# Patient Record
Sex: FEMALE | Race: WHITE
[De-identification: names, ages, dates, MRNs, and addresses within clinical notes are randomized per-mention and may not be internally consistent; named-entity substitution may affect disease eponyms.]

---

## 2017-02-18 ENCOUNTER — HOSPITAL ENCOUNTER (EMERGENCY)
Dept: HOSPITAL 10 - FTE | Age: 27
LOS: 1 days | Discharge: HOME | End: 2017-02-19
Payer: COMMERCIAL

## 2017-02-18 VITALS
WEIGHT: 155.43 LBS | BODY MASS INDEX: 25.9 KG/M2 | HEIGHT: 65 IN | HEIGHT: 65 IN | BODY MASS INDEX: 25.9 KG/M2 | WEIGHT: 155.43 LBS

## 2017-02-18 DIAGNOSIS — K64.9: ICD-10-CM

## 2017-02-18 DIAGNOSIS — R10.13: Primary | ICD-10-CM

## 2017-02-18 PROCEDURE — 99283 EMERGENCY DEPT VISIT LOW MDM: CPT

## 2017-02-18 PROCEDURE — 81003 URINALYSIS AUTO W/O SCOPE: CPT

## 2017-02-19 NOTE — ERD
ER Documentation


Chief Complaint


Date/Time


DATE: 2/19/17 


TIME: 00:03


Chief Complaint


AP x1 wk. feels bloated





HPI


Patient is a 27-year-old female with a past medical history of hemorrhoids, 

gastritis who presents the emergency department with epigastric pain 1 week.  

Patient states that she is having "heartburn" and increased burping over the 

last week.  Patient states that she does eat spicy and fried foods.  She states 

that she is trying to cut back.  Patient denies any fever, nausea, vomiting.  

Patient reports intermittent chills.  Patient also reports increased bloating.  

Patient states that today she had one episode of bright red blood on her toilet 

paper when wiping.  Patient states that she does strain.  Patient reports daily 

bowel movements but states that they are often hard.  She denies any chest pain

, shortness of breath, arm pain, diaphoresis.  Patient does have some burning 

pain with urination.  Patient denies any hematuria.  Patient states her last 

menstrual period was on February 4, 2017





ROS


All systems reviewed and are negative except as per history of present illness.





Medications


Home Meds


Active Scripts


Hydrocortisone Acetate* (Anusol-HC*) 30 Gm Cream.gm., 1 APPLIC DE BID, #1 TUB


   Prov:WILLOW MCCAULEY PA-C         2/19/17


Famotidine* (Pepcid*) 20 Mg Tablet, 20 MG PO BID for 30 Days, #60 TAB


   Prov:WILLOW MCCAULEY PA-C         2/18/17


Ibuprofen* (Motrin*) 600 Mg Tab, 600 MG PO Q6, #20 TAB


   Prov:NICK GAMEZ NP         5/9/16


Diclofenac Sodium* (Diclofenac Sodium*) 50 Mg Tablet.dr, 50 MG PO BID, #14 TAB


   Prov:NICK GAMEZ NP         5/9/16


Acetaminophen* (Tylophen*) 500 Mg Capsule, 1 CAP PO Q6H Y for PAIN AND OR 

ELEVATED TEMP, #20 CAP


   Prov:EULA LIMA MD         3/31/16


Famotidine* (Famotidine*) 40 Mg Tablet, 40 MG PO HS, #30 TAB


   Prov:EULA LIMA MD         3/31/16


Ibuprofen* (Motrin*) 600 Mg Tab, 600 MG PO Q6, #30 TAB


   Prov:TAYO HOPE         1/15/16


Pramoxine Hcl* (Anusol*) 28.3 Gm Oint...g., 1 APPLIC DE BID for 7 Days, TUB


   Prov:TAYO HOPE         1/15/16





Allergies


Allergies:  


Coded Allergies:  


     No Known Allergy (Unverified , 1/15/16)





PMhx/Soc


Medical and Surgical Hx:  pt denies Medical Hx, pt denies Surgical Hx


History of Surgery:  No


Anesthesia Reaction:  No


Hx Neurological Disorder:  No


Hx Respiratory Disorders:  No


Hx Cardiac Disorders:  No


Hx Psychiatric Problems:  No


Hx Miscellaneous Medical Probl:  No


Hx Alcohol Use:  Yes (SOCIALLY)


Hx Substance Use:  No


Hx Tobacco Use:  No


Smoking Status:  Never smoker





FmHx


Family History:  No diabetes





Physical Exam


Vitals





Vital Signs








  Date Time  Temp Pulse Resp B/P Pulse Ox O2 Delivery O2 Flow Rate FiO2


 


2/18/17 23:03 98.0 96 18 130/57 100   








Physical Exam


GENERAL: Well-developed, well-nourished female. Appears in no acute distress. 


HEAD: Normocephalic, atraumatic. 


EYES: Pupils are equally reactive bilaterally. EOMs grossly intact. No 

conjunctival erythema. 


ENT: Moist mucous membranes. No uvula deviation. No kissing tonsils. 


NECK: Supple. No meningismus. Normal range of motion of the neck.


LUNG: Clear to auscultation bilaterally. No rhonchi, wheezing, rales or coarse 

breath sounds. 


HEART: Regular rate and rhythm. No murmurs, rubs or gallops.


ABDOMEN: No scars, ecchymosis or rashes noted. Soft, nontender, and 

nondistended. Positive bowel sounds in all four quadrants. No rebound tenderness

, no guarding. (-) McBurney's point tenderness. No CVA tenderness.


RECTAL: Normal anus with+ external hemorrhoids.  Non erythematous, no swelling.

  No rectal prolapse.  Normal anal sphincter tone. 


BACK: No midline tenderness. 


EXTREMITIES: Equal pulses bilaterally. No peripheral clubbing, cyanosis or 

edema. No unilateral leg swelling.


NEUROLOGIC: Alert and oriented. Moving all four extremities without any 

difficulty. Normal speech. Steady gait. 


SKIN: Normal color. Warm and dry. No rashes or lesions.


Results 24 hrs





 Laboratory Tests








Test


  2/19/17


00:16


 


Bedside Urine Blood Trace-lysed 


 


Bedside Urine Glucose (UA) Negative 


 


Bedside Urine Ketones (LAB) Negative 


 


Bedside Urine Leukocyte


Esterase (L Negative 


 


 


Bedside Urine Nitrite (LAB) Negative 


 


Bedside Urine Protein (LAB) Negative 


 


Bedside Urine pH (LAB) 5.5 








 Current Medications








 Medications


  (Trade)  Dose


 Ordered  Sig/Key


 Route


 PRN Reason  Start Time


 Stop Time Status Last Admin


Dose Admin


 


 Miscellaneous


 Medication


  (Gi Cocktail (2))  40 ml  ONCE  ONCE


 PO


   2/19/17 00:00


 2/19/17 00:01 DC 2/19/17 00:09


 











Procedures/MDM


MEDICAL DECISION MAKING: 


This is a 27 year old female who presents with epigastric pain and one episode 

of rectal bleeding noted on toilet paper. Vital signs were reviewed. Patient is 

afebrile. Abdominal exam was negative. Rectal exam revealed external 

hemorrhoids. Patient was given a GI cocktail here in the ED, which she stated 

did improve her symptoms.Urine dip was negative for acute infection and 

hematuria. At this time, patient's presentation is most consistent with 

gastritis and external hemorrhoids. Unable to rule out internal hemorrhoids.  I 

have a much lower clinical concern for acute coronary syndrome, lower lobe 

pneumonia, bowel obstruction, cholecystitis, pancreatitis, diverticulitis, UTI, 

pyelonephritis, anorectal abscess, anal fissure, anal fistula, rectal prolapse. 

Unable to rule PUD. 








PRESCRIPTIONS: 


Pepcid, Anusol cream





DISCHARGE:


At this time, patient is stable for discharge and outpatient management. 

Dietary modifications advised, patient advised to avoid spicy, fried and acidic 

foods. I have instructed the patient to follow-up with his/her primary care 

physician in 1-2 days. Patient should follow up with GI specialist for possible 

endoscopy to rule out PUD and anoscopy for hemorrhoids. I have instructed the 

patient to promptly return to the ER at any time for any new or worsening 

symptoms including increased pain, nausea, vomiting, diarrhea, fever, weakness 

or LOC. The patient and/or family expressed understanding of and agreement with 

this plan. All questions were answered. Home care instructions were provided.





Departure


Diagnosis:  


 Primary Impression:  


 Abdominal pain


 Abdominal location:  unspecified location  Qualified Code:  R10.9 - Abdominal 

pain, unspecified location


 Additional Impression:  


 Hemorrhoids


 Hemorrhoid type:  unspecified  Qualified Code:  K64.9 - Hemorrhoids, 

unspecified hemorrhoid type


Condition:  Stable


Patient Instructions:  Abdominal Pain, Hemorrhoids





Additional Instructions:  


Call your primary care doctor TOMORROW for an appointment during the next 1-2 

days.See the doctor sooner or return here if your condition worsens before your 

appointment time.





Unable to rule out internal hemorrhoids at this time.  Patient may need to 

follow-up with a GI specialist if symptoms persist.  She may need a colonoscopy 

on an outpatient basis.  Patient was advised to increase fiber intake including 

apples, grapes, and prunes.  Patient advised to avoid straining.











WILLOW MCCAULEY PA-C Feb 19, 2017 00:06

## 2017-09-23 ENCOUNTER — HOSPITAL ENCOUNTER (EMERGENCY)
Dept: HOSPITAL 10 - FTE | Age: 27
Discharge: HOME | End: 2017-09-23
Payer: COMMERCIAL

## 2017-09-23 VITALS
HEIGHT: 68 IN | WEIGHT: 156.53 LBS | WEIGHT: 156.53 LBS | BODY MASS INDEX: 23.72 KG/M2 | BODY MASS INDEX: 23.72 KG/M2 | HEIGHT: 68 IN

## 2017-09-23 DIAGNOSIS — Z87.891: ICD-10-CM

## 2017-09-23 DIAGNOSIS — R10.2: Primary | ICD-10-CM

## 2017-09-23 LAB
ADD UMIC: YES
BACTERIA #/AREA URNS HPF: (no result) /HPF
COLOR UR: YELLOW
GLUCOSE UR STRIP-MCNC: NEGATIVE MG/DL
KETONES UR STRIP.AUTO-MCNC: NEGATIVE MG/DL
MUCOUS THREADS #/AREA URNS HPF: (no result) /HPF
NITRITE UR QL STRIP.AUTO: NEGATIVE MG/DL
RBC # UR AUTO: NEGATIVE MG/DL
SQUAMOUS #/AREA URNS HPF: (no result) /HPF
UR ASCORBIC ACID: NEGATIVE MG/DL
UR BILIRUBIN (DIP): NEGATIVE MG/DL
UR CLARITY: (no result)
UR NONSQUAMOUS EPITHELIAL CELL: 1 /HPF
UR PH (DIP): 6 (ref 5–9)
UR RBC: 1 /HPF (ref 0–5)
UR SPECIFIC GRAVITY (DIP): 1.03 (ref 1–1.03)
UR TOTAL PROTEIN (DIP): NEGATIVE MG/DL
UROBILINOGEN UR STRIP-ACNC: NEGATIVE MG/DL
WBC # UR STRIP: (no result) LEU/UL

## 2017-09-23 PROCEDURE — 76830 TRANSVAGINAL US NON-OB: CPT

## 2017-09-23 PROCEDURE — 81001 URINALYSIS AUTO W/SCOPE: CPT

## 2017-09-23 PROCEDURE — 76856 US EXAM PELVIC COMPLETE: CPT

## 2017-09-23 PROCEDURE — 87591 N.GONORRHOEAE DNA AMP PROB: CPT

## 2017-09-23 PROCEDURE — 99284 EMERGENCY DEPT VISIT MOD MDM: CPT

## 2017-09-23 NOTE — RADRPT
PROCEDURE:   US Pelvis Transabdominal and Transvaginal. 

 

CLINICAL INDICATION:  Pelvic pain.

 

TECHNIQUE:   Multiple sonographic images of the pelvis were obtained utilizing gray scale, Doppler a
nd color flow imaging with transabdominal and endovaginal technique.   The images were reviewed on a
 PACS workstation. 

 

COMPARISON:   January 15, 2016 

 

FINDINGS:

The uterus is visualized and measures 7.3 x 3.5 x 4.1 cm. The endometrial echo complex measures 14.1
 mm in thickness. No uterine masses are identified.

 

The right ovary measures 2.8 x 1.7 x 1.8 cm .  The left ovary measures 3.0 x 2.2 x 2.4 cm.  A 2.0 cm
 cyst containing echogenic debris is identified on the left ovary. The ovaries demonstrate normal va
scularity..

 

No adnexal masses or pelvic free fluid are noted. 

 

IMPRESSION:

2.0 cm cyst containing echogenic debris on the left ovary. This likely reflects a hemorrhagic cyst. 
Continued follow-up with repeat exam in 12 weeks to assess stability can be considered.

 

Otherwise, unremarkable exam. 

 

If further characterization of the organs of the pelvis is needed MRI should be considered.

 

 

RPTAT: AA

_____________________________________________ 

.Broderick Culver MD, MD           Date    Time 

Electronically viewed and signed by .Broderick Culver MD, MD on 09/23/2017 13:10 

 

D:  09/23/2017 13:10  T:  09/23/2017 13:10

.P/

## 2017-09-23 NOTE — ERD
ER Documentation


Chief Complaint


Date/Time


DATE: 17 


TIME: 11:53


Chief Complaint


PELVIC PAIN X 3 DAYS





HPI


This is a 27-year-old female who presents the emergency department today 

complaining of intermittent pelvic pain for the past 2 days.  States that she 

has had pelvic pain in the past and was told that she had a cyst and she did 

follow up with her primary doctor and was told there is nothing she could do 

for it.  States she is currently active with one sexual partner and had pain 

with intercourse the other day.  Denies use of contraception.  States she is 

not trying to get pregnant.  States that a week ago she was diagnosed with a 

UTI but stopped taking medication because it made her nauseated.  States 

sometimes she feels bloated and thinks that she is constipated.  Denies any 

fevers or chills.  Denies any abdominal pain or pelvic pain currently denies 

any vaginal bleeding.  States she is unsure if she has vaginal discharge.





ROS


All systems reviewed and are negative except as per history of present illness.





Medications


Home Meds


Active Scripts


Fluconazole* (Diflucan*) 150 Mg Tablet, 150 MG PO ONCE, #1 TAB


   Prov:DELMIS JC PA-C         17


Clotrimazole* (Clotrimazole* AF) 1% - 30 Gm Cream.gm., 1 APPLIC TOP BID for 7 

Days, #1 TUB


   Prov:DELMIS JC PA-C         17


Naproxen* (Naprosyn*) 500 Mg Tablet, 500 MG PO BID Y for PAIN AND/OR 

INFLAMMATION, #30 TAB


   Prov:DELMIS JC PA-C         17


Hydrocortisone Acetate* (Anusol-HC*) 30 Gm Cream.gm., 1 APPLIC [place] BID, #1 TUB


   Prov:WILLOW MCCAULEY PA-C         17


Famotidine* (Pepcid*) 20 Mg Tablet, 20 MG PO BID for 30 Days, #60 TAB


   Prov:WILLOW MCCAULEY PA-C         17


Ibuprofen* (Motrin*) 600 Mg Tab, 600 MG PO Q6, #20 TAB


   Prov:NICK GAMEZ NP         16


Diclofenac Sodium* (Diclofenac Sodium*) 50 Mg Tablet.dr, 50 MG PO BID, #14 TAB


   Prov:GAMEZNICK HOFFMAN NP         16


Acetaminophen* (Tylophen*) 500 Mg Capsule, 1 CAP PO Q6H Y for PAIN AND OR 

ELEVATED TEMP, #20 CAP


   Prov:EULA LIMA MD         3/31/16


Famotidine* (Famotidine*) 40 Mg Tablet, 40 MG PO HS, #30 TAB


   Prov:EULA LIMA MD         3/31/16


Ibuprofen* (Motrin*) 600 Mg Tab, 600 MG PO Q6, #30 TAB


   Prov:TAYO HOPE         1/15/16


Pramoxine Hcl* (Anusol*) 28.3 Gm Oint...g., 1 APPLIC KS BID for 7 Days, TUB


   Prov:HERMINIATAYO C         1/15/16





Allergies


Allergies:  


Coded Allergies:  


     No Known Allergy (Unverified , 1/15/16)





PMhx/Soc


History of Surgery:  No


Anesthesia Reaction:  No


Hx Neurological Disorder:  No


Hx Respiratory Disorders:  No


Hx Cardiac Disorders:  No


Hx Psychiatric Problems:  No


Hx Miscellaneous Medical Probl:  No


Hx Alcohol Use:  Yes (SOCIALLY)


Hx Substance Use:  No


Hx Tobacco Use:  No


Smoking Status:  Former smoker





Physical Exam


Vitals





Vital Signs








  Date Time  Temp Pulse Resp B/P Pulse Ox O2 Delivery O2 Flow Rate FiO2


 


17 11:29 98.0 68 18 117/59 100   








Physical Exam


Const:     NAD


Head:   Atraumatic 


Eyes:    Normal Conjunctiva


ENT:    Normal External Ears, Nose and Mouth.


Neck:               Full range of motion..~ No meningismus.


Resp:    Clear to auscultation bilaterally


Cardio:    Regular rate and rhythm, no murmurs


Abd:    Soft, non tender, non distended. Normal bowel sounds


   pelvic exam with evidence of significant amount of yeast.  No cervical 

motion tenderness.  No bleeding


Skin:    No petechiae or rashes


Back:    No midline or flank tenderness


Ext:    No cyanosis, or edema


Neur:    Awake and alert


Psych:    Normal Mood and Affect


Results 24 hrs





 Laboratory Tests








Test


  17


11:51


 


Urine Color YELLOW 


 


Urine Clarity CLOUDY 


 


Urine pH 6.0 


 


Urine Specific Gravity 1.028 


 


Urine Ketones NEGATIVEmg/dL 


 


Urine Nitrite NEGATIVEmg/dL 


 


Urine Bilirubin NEGATIVEmg/dL 


 


Urine Urobilinogen NEGATIVEmg/dL 


 


Urine Leukocyte Esterase TRACELeu/ul 


 


Urine Microscopic RBC 1/HPF 


 


Urine Microscopic WBC 3/HPF 


 


Urine Squamous Epithelial


Cells MANY/HPF 


 


 


Urine Bacteria FEW/HPF 


 


Urine Mucus FEW/HPF 


 


Urine Hemoglobin NEGATIVEmg/dL 


 


Urine Glucose NEGATIVEmg/dL 


 


Urine Total Protein NEGATIVEmg/dl 





DIAGNOSTIC IMAGING REPORT





 Patient: SARA HOLLINGSWORTH   : 1990   Age: 27  Sex: F               

         


 MR #:    [id number]   Acct #:   [id number]    DOS: 17 0000


 Ordering MD: DELMIS JC PA-C   Location:  FTE   Room/Bed:             

                               


 








PROCEDURE:   US Pelvis Transabdominal and Transvaginal. 


 


CLINICAL INDICATION:  Pelvic pain.


 


TECHNIQUE:   Multiple sonographic images of the pelvis were obtained utilizing 

gray scale, Doppler and color flow imaging with transabdominal and endovaginal 

technique.   The images were reviewed on a PACS workstation. 


 


COMPARISON:   January 15, 2016 


 


FINDINGS:


The uterus is visualized and measures 7.3 x 3.5 x 4.1 cm. The endometrial echo 

complex measures 14.1 mm in thickness. No uterine masses are identified.


 


The right ovary measures 2.8 x 1.7 x 1.8 cm .  The left ovary measures 3.0 x 

2.2 x 2.4 cm.  A 2.0 cm cyst containing echogenic debris is identified on the 

left ovary. The ovaries demonstrate normal vascularity..


 


No adnexal masses or pelvic free fluid are noted. 


 


IMPRESSION:


2.0 cm cyst containing echogenic debris on the left ovary. This likely reflects 

a hemorrhagic cyst. Continued follow-up with repeat exam in 12 weeks to assess 

stability can be considered.


 


Otherwise, unremarkable exam. 


 


If further characterization of the organs of the pelvis is needed MRI should be 

considered.


 


 


RPTAT: AA


_____________________________________________ 


.Broderick Culver MD, MD           Date    Time 


Electronically viewed and signed by .Broderick Culver MD, MD on 2017 13:10 


 


D:  2017 13:10  T:  2017 13:10


.P/





CC: DELMIS JC PA-C





Procedures/[facility]


This 27-year-old female who presents the emergency department today complaining 

of intermittent pelvic pain for the past couple of days and pain with 

intercourse.  Patient is a poor historian and stated that she was told that she 

had a urinary tract infection and then reiterated that the doctor told her she 

did not have one but she was just given antibiotics anyway.





Given patient's complaints I did obtain a pelvic ultrasound and UA.





UA shows trace leukocyte esterase and no increase in microscopic white blood 

cells.  There are many epithelial cells.


Urine was sent for gonorrhea and chlamydia


Pregnancy test is negative


Ultrasound is a 2 cm cyst containing echogenic debris on the left ovary.  This 

likely reflects a hemorrhagic cyst.  Repeat follow-up exam in 12 weeks was 

recommended.  There is normal vascularity to bilateral ovaries.  There is no 

adnexal mass or free fluid.





Pelvic exam showed a significant amount of yeast and patient will be given a 

prescription for Chlortrimazole cream as well as Diflucan.  She was instructed 

to follow back up and keep her appointment with her gynecologist for her repeat 

pelvic and Pap smear.  Patient sees may be the cause of her pelvic pain and 

pain with intercourse and dysuria.  She has no cervical motion tenderness and I 

have low suspicion for PID.  She is afebrile and otherwise well-appearing.  

This patient for ectopic pregnancy or tubo-ovarian abscess.





She denies any pain currently.  She will be given a prescription for Tylenol 

for home addition to the Chlortrimazole and Diflucan.





At this time the patient is stable for discharge and outpatient management. 

Patient should follow up with their PCP in the next 1-2 days.  They may return 

to the emergency department sooner for any persistent or worsening of symptoms.

  Patient understood and agreed with the plan.





Departure


Diagnosis:  


 Primary Impression:  


 Pelvic pain


Condition:  DELMIS Gan PA-C Sep 23, 2017 11:57

## 2017-09-27 LAB — LABORATORY COMMENT REPORT: (no result)

## 2018-01-02 ENCOUNTER — HOSPITAL ENCOUNTER (EMERGENCY)
Dept: HOSPITAL 91 - FTE | Age: 28
LOS: 1 days | Discharge: LEFT BEFORE DISCHARGE | End: 2018-01-03
Payer: COMMERCIAL

## 2018-01-02 ENCOUNTER — HOSPITAL ENCOUNTER (EMERGENCY)
Age: 28
LOS: 1 days | Discharge: LEFT BEFORE DISCHARGE | End: 2018-01-03

## 2018-01-02 DIAGNOSIS — L65.8: Primary | ICD-10-CM

## 2018-01-02 PROCEDURE — 99282 EMERGENCY DEPT VISIT SF MDM: CPT

## 2018-06-15 ENCOUNTER — HOSPITAL ENCOUNTER (EMERGENCY)
Age: 28
LOS: 1 days | Discharge: HOME | End: 2018-06-16

## 2018-06-15 ENCOUNTER — HOSPITAL ENCOUNTER (EMERGENCY)
Dept: HOSPITAL 91 - FTE | Age: 28
LOS: 1 days | Discharge: HOME | End: 2018-06-16
Payer: COMMERCIAL

## 2018-06-15 DIAGNOSIS — R21: Primary | ICD-10-CM

## 2018-06-15 DIAGNOSIS — R40.2412: ICD-10-CM

## 2018-06-15 PROCEDURE — 99283 EMERGENCY DEPT VISIT LOW MDM: CPT

## 2019-04-29 ENCOUNTER — HOSPITAL ENCOUNTER (EMERGENCY)
Dept: HOSPITAL 91 - FTE | Age: 29
LOS: 1 days | Discharge: HOME | End: 2019-04-30
Payer: COMMERCIAL

## 2019-04-29 ENCOUNTER — HOSPITAL ENCOUNTER (EMERGENCY)
Dept: HOSPITAL 10 - FTE | Age: 29
LOS: 1 days | Discharge: HOME | End: 2019-04-30
Payer: COMMERCIAL

## 2019-04-29 VITALS
HEIGHT: 64 IN | WEIGHT: 188.72 LBS | BODY MASS INDEX: 32.22 KG/M2 | HEIGHT: 64 IN | WEIGHT: 188.72 LBS | BODY MASS INDEX: 32.22 KG/M2

## 2019-04-29 DIAGNOSIS — K29.00: ICD-10-CM

## 2019-04-29 DIAGNOSIS — K30: Primary | ICD-10-CM

## 2019-04-29 PROCEDURE — 99283 EMERGENCY DEPT VISIT LOW MDM: CPT

## 2019-04-29 RX ADMIN — ALUMINUM HYDROXIDE, MAGNESIUM HYDROXIDE, DIMETHICONE 1 ML: 200; 200; 20 SUSPENSION ORAL at 23:28

## 2019-04-29 RX ADMIN — FAMOTIDINE 1 MG: 20 TABLET ORAL at 23:28

## 2019-04-29 RX ADMIN — ONDANSETRON 1 MG: 4 TABLET, ORALLY DISINTEGRATING ORAL at 23:28

## 2019-04-29 NOTE — ERD
ER Documentation


Chief Complaint


Chief Complaint





ABD PAIN WITH N/V XTODAY; AFTER EATING EGGS





HPI


29-year-old female presenting with complaints of epigastric abdominal pain with 


burning in her chest and acid reflux.  Her symptoms have been going on for quite


some time now but worsening.  She is on a tetracycline for her acne and states 


that when she started this medication, her symptoms started.  She sometimes 


forgets to take it with plenty of water and food.  Today her symptoms were worse


than usual with more acid reflux and nonbloody and nonbilious vomiting.  No hem


atochezia, melena, constipation or diarrhea.  No fevers or chills.  No chest 


pain or shortness of breath otherwise.  She has not tried to take anything for 


her symptoms.





ROS


All systems reviewed and are negative except as per history of present illness.





Medications


Home Meds


Active Scripts


Famotidine* (Pepcid*) 20 Mg Tablet, 20 MG PO BID for 14 Days, TAB


   Prov:MINDY MCKINNON MD         4/29/19


Betamethasone Dipropionate* (Betamethasone Dipropionate*) 0.05% - 15 Gm Oint, 1 


APPLIC TOP BID for 7 Days, #15 GM


   APPLY TO:


   Prov:EDDIEENOCH         6/16/18


Fluconazole* (Diflucan*) 150 Mg Tablet, 150 MG PO ONCE, #1 TAB


   Prov:DELMIS JC PA-C         9/23/17


Clotrimazole* (Clotrimazole* AF) 1% - 30 Gm Cream.gm., 1 APPLIC TOP BID for 7 


Days, #1 TUB


   Prov:DELMIS JC PA-C         9/23/17


Naproxen* (Naprosyn*) 500 Mg Tablet, 500 MG PO BID PRN for PAIN AND/OR 


INFLAMMATION, #30 TAB


   Prov:DELMIS JC PA-C         9/23/17


Hydrocortisone Acetate* (Anusol-HC*) 30 Gm Cream.gm., 1 APPLIC [place] BID, #1 TUB


   Prov:WILLOW MCCAULEY PA-C         2/19/17


Famotidine* (Pepcid*) 20 Mg Tablet, 20 MG PO BID for 30 Days, #60 TAB


   Prov:WILLOW MCCAULEY PA-C         2/18/17


Ibuprofen* (Motrin*) 600 Mg Tab, 600 MG PO Q6, #20 TAB


   Prov:NICK GAMEZ I. NP         5/9/16


Diclofenac Sodium* (Diclofenac Sodium*) 50 Mg Tablet.dr, 50 MG PO BID, #14 TAB


   Prov:NICK GAMEZ I. NP         5/9/16


Acetaminophen* (Tylophen*) 500 Mg Capsule, 1 CAP PO Q6H PRN for PAIN AND OR 


ELEVATED TEMP, #20 CAP


   Prov:EULA LIMA MD         3/31/16


Famotidine* (Famotidine*) 40 Mg Tablet, 40 MG PO HS, #30 TAB


   Prov:EULA LIMA MD         3/31/16


Ibuprofen* (Motrin*) 600 Mg Tab, 600 MG PO Q6, #30 TAB


   Prov:TAYO HOPE         1/15/16


Pramoxine Hcl* (Anusol*) 28.3 Gm Oint...g., 1 APPLIC ND BID for 7 Days, TUB


   Prov:TAYO HOPE         1/15/16





Allergies


Allergies:  


Coded Allergies:  


     No Known Allergy (Unverified , 1/15/16)





PMhx/Soc


History of Surgery:  No


Anesthesia Reaction:  No


Hx Neurological Disorder:  No


Hx Respiratory Disorders:  No


Hx Cardiac Disorders:  No


Hx Psychiatric Problems:  No


Hx Miscellaneous Medical Probl:  No


Hx Alcohol Use:  Yes (SOCIALLY)


Hx Substance Use:  No


Hx Tobacco Use:  No


Smoking Status:  Never smoker





FmHx


Family History:  No diabetes





Physical Exam


Vitals





Vital Signs


  Date      Temp  Pulse  Resp  B/P (MAP)   Pulse Ox  O2          O2 Flow    FiO2


Time                                                 Delivery    Rate


   4/30/19  98.0     62    16      114/74        98  Room Air


     00:00                           (87)


   4/29/19  98.8     83    19      144/63       100


     21:21                           (90)





Physical Exam


Const:   No acute distress


Head:   Atraumatic 


Eyes:    Normal Conjunctiva


ENT:    Normal External Ears, Nose and Mouth.


Neck:               Full range of motion. No meningismus.


Resp:   Clear to auscultation bilaterally


Cardio:   Regular rate and rhythm, no murmurs


Abd:    Soft, non tender, non distended.  No McBurney's point tenderness.  


Negative Baumann sign.  Normal bowel sounds


Skin:   No petechiae or rashes


Back:   No midline or flank tenderness


Ext:    No cyanosis, or edema


Neur:   Awake and alert


Psych:    Normal Mood and Affect


Results 24 hrs





Current Medications


 Medications
   Dose
          Sig/Key
       Start Time
   Status  Last


 (Trade)       Ordered        Route
 PRN     Stop Time              Admin
Dose


                              Reason                                Admin


 Famotidine
    20 mg          ONCE  STAT
    4/29/19       DC           4/29/19


(Pepcid)                      PO
            23:22
                       23:28



                                             4/29/19 23:23


                40 ml          ONCE  STAT
    4/29/19       DC           4/29/19


Miscellaneous                 PO
            23:22
                       23:28




 Medication
                                4/29/19 23:23


 (Gi Cocktail


(2))


 Ondansetron    4 mg           ONCE  STAT
    4/29/19       DC           4/29/19


HCl
  (Zofran                 ODT
           23:22
                       23:28



Odt)                                         4/29/19 23:23








Procedures/MDM


___________________________________________________________ 


Initial Nursing notes reviewed. 


Previous Medical Records requested via the Electronic Health Record. 





EMERGENCY DEPARTMENT COURSE / MEDICAL DECISION MAKING: 





Patient symptoms are most consistent with tetracycline induced gastritis.  I ha


ve a low suspicion for acute surgical abdomen.  She is afebrile with 


unremarkable vitals.  She was treated with Pepcid, GI cocktail, and Zofran.  She


was able to tolerate fluids by mouth.  Diet precautions were discussed.  I gave 


her a prescription for Pepcid.  Advised drinking plenty of fluid and eating with


her medication.  If her symptoms do not improve, she may have to stop taking the


medication.  If this is the case, I recommend that she follow-up with her 


primary care doctor to discuss discontinuing or changing the medication.  


Patient is agreeable with this plan.








Patient's blood pressure was elevated (>120/80) but appears stable without 


evidence of hypertensive emergency or urgency. The patient was counseled about 


the risks of hypertension and urged to pursue outpatient monitoring and therapy 


within a week with their primary care physician.





Departure


Diagnosis:  


   Primary Impression:  


   Drug-induced GI disturbance


   Additional Impression:  


   Gastritis


   Gastritis type:  other gastritis  Chronicity:  acute  Gastritis bleeding:  


   without bleeding  Qualified Codes:  K29.00 - Acute gastritis without bleeding


Condition:  Stable


Patient Instructions:  Gastritis (Adult)











MINDY MCKINNON MD         Apr 29, 2019 23:27

## 2019-04-30 VITALS — HEART RATE: 62 BPM | SYSTOLIC BLOOD PRESSURE: 114 MMHG | DIASTOLIC BLOOD PRESSURE: 74 MMHG | RESPIRATION RATE: 16 BRPM

## 2019-06-03 ENCOUNTER — HOSPITAL ENCOUNTER (EMERGENCY)
Dept: HOSPITAL 91 - FTE | Age: 29
Discharge: HOME | End: 2019-06-03
Payer: COMMERCIAL

## 2019-06-03 ENCOUNTER — HOSPITAL ENCOUNTER (EMERGENCY)
Dept: HOSPITAL 10 - FTE | Age: 29
Discharge: HOME | End: 2019-06-03
Payer: COMMERCIAL

## 2019-06-03 VITALS — SYSTOLIC BLOOD PRESSURE: 132 MMHG | DIASTOLIC BLOOD PRESSURE: 61 MMHG | HEART RATE: 77 BPM | RESPIRATION RATE: 20 BRPM

## 2019-06-03 VITALS
BODY MASS INDEX: 31.63 KG/M2 | WEIGHT: 189.82 LBS | WEIGHT: 189.82 LBS | BODY MASS INDEX: 31.63 KG/M2 | HEIGHT: 65 IN | HEIGHT: 65 IN

## 2019-06-03 DIAGNOSIS — D64.9: ICD-10-CM

## 2019-06-03 DIAGNOSIS — R00.2: Primary | ICD-10-CM

## 2019-06-03 LAB
ADD MAN DIFF?: NO
ALANINE AMINOTRANSFERASE: 57 IU/L (ref 13–69)
ALBUMIN/GLOBULIN RATIO: 1.2
ALBUMIN: 4.1 G/DL (ref 3.3–4.9)
ALKALINE PHOSPHATASE: 76 IU/L (ref 42–121)
ANION GAP: 8 (ref 5–13)
ASPARTATE AMINO TRANSFERASE: 34 IU/L (ref 15–46)
BASOPHIL #: 0 10^3/UL (ref 0–0.1)
BASOPHILS %: 0.5 % (ref 0–2)
BILIRUBIN,DIRECT: 0 MG/DL (ref 0–0.2)
BILIRUBIN,TOTAL: 0.4 MG/DL (ref 0.2–1.3)
BLOOD UREA NITROGEN: 10 MG/DL (ref 7–20)
CALCIUM: 9.2 MG/DL (ref 8.4–10.2)
CARBON DIOXIDE: 27 MMOL/L (ref 21–31)
CHLORIDE: 105 MMOL/L (ref 97–110)
CREATININE: 0.77 MG/DL (ref 0.44–1)
EOSINOPHILS #: 0.1 10^3/UL (ref 0–0.5)
EOSINOPHILS %: 1.3 % (ref 0–7)
GLOBULIN: 3.4 G/DL (ref 1.3–3.2)
GLUCOSE: 110 MG/DL (ref 70–220)
HEMATOCRIT: 34 % (ref 37–47)
HEMOGLOBIN: 10.6 G/DL (ref 12–16)
IMMATURE GRANS #M: 0.01 10^3/UL (ref 0–0.03)
IMMATURE GRANS % (M): 0.2 % (ref 0–0.43)
LYMPHOCYTES #: 1.4 10^3/UL (ref 0.8–2.9)
LYMPHOCYTES %: 22 % (ref 15–51)
MEAN CORPUSCULAR HEMOGLOBIN: 26.8 PG (ref 29–33)
MEAN CORPUSCULAR HGB CONC: 31.2 G/DL (ref 32–37)
MEAN CORPUSCULAR VOLUME: 86.1 FL (ref 82–101)
MEAN PLATELET VOLUME: 10.9 FL (ref 7.4–10.4)
MONOCYTE #: 0.6 10^3/UL (ref 0.3–0.9)
MONOCYTES %: 9.8 % (ref 0–11)
NEUTROPHIL #: 4.1 10^3/UL (ref 1.6–7.5)
NEUTROPHILS %: 66.2 % (ref 39–77)
NUCLEATED RED BLOOD CELLS #: 0 10^3/UL (ref 0–0)
NUCLEATED RED BLOOD CELLS%: 0 /100WBC (ref 0–0)
PLATELET COUNT: 234 10^3/UL (ref 140–415)
POTASSIUM: 4.2 MMOL/L (ref 3.5–5.1)
RED BLOOD COUNT: 3.95 10^6/UL (ref 4.2–5.4)
RED CELL DISTRIBUTION WIDTH: 13.3 % (ref 11.5–14.5)
SODIUM: 140 MMOL/L (ref 135–144)
TOTAL PROTEIN: 7.5 G/DL (ref 6.1–8.1)
TROPONIN-I: < 0.012 NG/ML (ref 0–0.12)
WHITE BLOOD COUNT: 6.1 10^3/UL (ref 4.8–10.8)

## 2019-06-03 PROCEDURE — 84484 ASSAY OF TROPONIN QUANT: CPT

## 2019-06-03 PROCEDURE — 99284 EMERGENCY DEPT VISIT MOD MDM: CPT

## 2019-06-03 PROCEDURE — 80053 COMPREHEN METABOLIC PANEL: CPT

## 2019-06-03 PROCEDURE — 85025 COMPLETE CBC W/AUTO DIFF WBC: CPT

## 2019-06-03 PROCEDURE — 36415 COLL VENOUS BLD VENIPUNCTURE: CPT

## 2019-06-03 PROCEDURE — 93005 ELECTROCARDIOGRAM TRACING: CPT

## 2019-06-03 NOTE — ERD
ER Documentation


Chief Complaint


Chief Complaint





heart palpitations off and on since Fri. states SOB when she walks.





HPI


29-year-old female with history of anemia presents complaint of palpitations on 


and off since Friday.  States that she just started a new job she is been 


feeling very stressed out.  In addition states patient having some mild 


shortness of breath when she walks.  Denies any chest pain, hemoptysis, cough, 


headaches, extremity swelling, extremity erythema, malignancy, recent travel, 


recent stasis.





ROS


All systems reviewed and are negative except as per history of present illness.





Medications


Home Meds


Active Scripts


Famotidine* (Pepcid*) 20 Mg Tablet, 20 MG PO BID for 14 Days, TAB


   Prov:MINDY MCKINNON MD         4/29/19


Betamethasone Dipropionate* (Betamethasone Dipropionate*) 0.05% - 15 Gm Oint, 1 


APPLIC TOP BID for 7 Days, #15 GM


   APPLY TO:


   Prov:ENOCH MORGAN         6/16/18


Fluconazole* (Diflucan*) 150 Mg Tablet, 150 MG PO ONCE, #1 TAB


   Prov:DELMIS JC PA-C         9/23/17


Clotrimazole* (Clotrimazole* AF) 1% - 30 Gm Cream.gm., 1 APPLIC TOP BID for 7 


Days, #1 TUB


   Prov:DELMIS JC PA-C         9/23/17


Naproxen* (Naprosyn*) 500 Mg Tablet, 500 MG PO BID PRN for PAIN AND/OR 


INFLAMMATION, #30 TAB


   Prov:DELMIS JC PA-C         9/23/17


Hydrocortisone Acetate* (Anusol-HC*) 30 Gm Cream.gm., 1 APPLIC GA BID, #1 TUB


   Prov:WILLOW MCCAULEYC         2/19/17


Famotidine* (Pepcid*) 20 Mg Tablet, 20 MG PO BID for 30 Days, #60 TAB


   Prov:WILLOW MCCAULEYC         2/18/17


Ibuprofen* (Motrin*) 600 Mg Tab, 600 MG PO Q6, #20 TAB


   Prov:NICK GAMEZ NP         5/9/16


Diclofenac Sodium* (Diclofenac Sodium*) 50 Mg Tablet.dr, 50 MG PO BID, #14 TAB


   Prov:NICK GAMEZ SUPRIYA DENIS         5/9/16


Acetaminophen* (Tylophen*) 500 Mg Capsule, 1 CAP PO Q6H PRN for PAIN AND OR 


ELEVATED TEMP, #20 CAP


   Prov:EULA LIMA MD         3/31/16


Famotidine* (Famotidine*) 40 Mg Tablet, 40 MG PO HS, #30 TAB


   Prov:EULA LIMA MD         3/31/16


Ibuprofen* (Motrin*) 600 Mg Tab, 600 MG PO Q6, #30 TAB


   Prov:HERMINIATAYO DICKENS C         1/15/16


Pramoxine Hcl* (Anusol*) 28.3 Gm Oint...g., 1 APPLIC GA BID for 7 Days, TUB


   Prov:HERMINIA,TAYO C         1/15/16





Allergies


Allergies:  


Coded Allergies:  


     No Known Allergy (Unverified , 1/15/16)





PMhx/Soc


Medical and Surgical Hx:  pt denies Surgical Hx


History of Surgery:  No


Anesthesia Reaction:  No


Hx Neurological Disorder:  No


Hx Respiratory Disorders:  No


Hx Cardiac Disorders:  No


Hx Psychiatric Problems:  No


Hx Miscellaneous Medical Probl:  No


Hx Alcohol Use:  Yes (SOCIALLY)


Hx Substance Use:  No


Hx Tobacco Use:  No


Smoking Status:  Never smoker





FmHx


Family History:  No diabetes, No coronary disease, No other





Physical Exam


Vitals





Vital Signs


  Date      Temp  Pulse  Resp  B/P (MAP)   Pulse Ox  O2          O2 Flow    FiO2


Time                                                 Delivery    Rate


    6/3/19  99.3     77    20      132/61        98


     18:30                           (84)





Physical Exam


Const:   No acute distress


Head:   Atraumatic 


Eyes:    Normal Conjunctiva


ENT:    Normal External Ears, Nose and Mouth.


Neck:               Full range of motion. No meningismus.


Resp:   Clear to auscultation bilaterally


Cardio:   Regular rate and rhythm, no murmurs


Abd:    Soft, non tender, non distended. Normal bowel sounds


Skin:   No petechiae or rashes


Back:   No midline or flank tenderness


Ext:    No cyanosis, or edema


Neur:   Awake and alert


Psych:    Normal Mood and Affect


Result Diagram:  


6/3/19 1857                                                                     


          6/3/19 1857





Results 24 hrs





Laboratory Tests


              Test
                                  6/3/19
18:57


              White Blood Count                      6.1 10^3/ul


              Red Blood Count                       3.95 10^6/ul


              Hemoglobin                               10.6 g/dl


              Hematocrit                                  34.0 %


              Mean Corpuscular Volume                    86.1 fl


              Mean Corpuscular Hemoglobin                26.8 pg


              Mean Corpuscular Hemoglobin
Concent     31.2 g/dl 



              Red Cell Distribution Width                 13.3 %


              Platelet Count                         234 10^3/UL


              Mean Platelet Volume                       10.9 fl


              Immature Granulocytes %                    0.200 %


              Neutrophils %                               66.2 %


              Lymphocytes %                               22.0 %


              Monocytes %                                  9.8 %


              Eosinophils %                                1.3 %


              Basophils %                                  0.5 %


              Nucleated Red Blood Cells %            0.0 /100WBC


              Immature Granulocytes #              0.010 10^3/ul


              Neutrophils #                          4.1 10^3/ul


              Lymphocytes #                          1.4 10^3/ul


              Monocytes #                            0.6 10^3/ul


              Eosinophils #                          0.1 10^3/ul


              Basophils #                            0.0 10^3/ul


              Nucleated Red Blood Cells #            0.0 10^3/ul


              Sodium Level                            140 mmol/L


              Potassium Level                         4.2 mmol/L


              Chloride Level                          105 mmol/L


              Carbon Dioxide Level                     27 mmol/L


              Anion Gap                                        8


              Blood Urea Nitrogen                       10 mg/dl


              Creatinine                              0.77 mg/dl


              Est Glomerular Filtrat Rate
mL/min   > 60 mL/min 



              Glucose Level                            110 mg/dl


              Calcium Level                            9.2 mg/dl


              Total Bilirubin                          0.4 mg/dl


              Direct Bilirubin                        0.00 mg/dl


              Indirect Bilirubin                       0.4 mg/dl


              Aspartate Amino Transf
(AST/SGOT)         34 IU/L 



              Alanine Aminotransferase
(ALT/SGPT)       57 IU/L 



              Alkaline Phosphatase                       76 IU/L


              Troponin I                           < 0.012 ng/ml


              Total Protein                             7.5 g/dl


              Albumin                                   4.1 g/dl


              Globulin                                 3.40 g/dl


              Albumin/Globulin Ratio                        1.20








Procedures/MDM


EKG: 


Rate/Rhythm:       Normal Sinus Rhythm


QRS, ST, T-waves:    No changes consistent w/ acute ischemia


Impression:      No evidence of ischemia or arrhythmia





MDM: Lab work is within normal limits except for slight anemia which patient had


already noted on exam on for which she is taking iron for.  Is possible that the


anemia is causing her palpitations.  Her electrolytes are normal.  EKG was 


within normal limits.  I have low suspicition for acute coronary syndrome, 


arrhythmia, pulmonary embolism, aortic dissection, AAA, pneumothorax, esophageal


rupture, pericarditis, myocarditis, or pneumonia based on EKG, imaging, labs, 


patient history and exam.  Patient discharged with strict ER precautions. 


Patient advised to follow up with PMD. All questions answered at discharge.





Departure


Diagnosis:  


   Primary Impression:  


   Palpitations


   Additional Impression:  


   Anemia


   Anemia type:  unspecified type  Qualified Codes:  D64.9 - Anemia, unspecified


Condition:  Stable











BURTON RAMÍREZ                Morales 3, 2019 18:56

## 2019-08-16 ENCOUNTER — HOSPITAL ENCOUNTER (EMERGENCY)
Dept: HOSPITAL 91 - FTE | Age: 29
Discharge: HOME | End: 2019-08-16
Payer: COMMERCIAL

## 2019-08-16 ENCOUNTER — HOSPITAL ENCOUNTER (EMERGENCY)
Dept: HOSPITAL 10 - FTE | Age: 29
Discharge: HOME | End: 2019-08-16
Payer: COMMERCIAL

## 2019-08-16 VITALS — RESPIRATION RATE: 17 BRPM | HEART RATE: 70 BPM | DIASTOLIC BLOOD PRESSURE: 65 MMHG | SYSTOLIC BLOOD PRESSURE: 119 MMHG

## 2019-08-16 VITALS
WEIGHT: 187.39 LBS | BODY MASS INDEX: 31.22 KG/M2 | HEIGHT: 65 IN | HEIGHT: 65 IN | WEIGHT: 187.39 LBS | BODY MASS INDEX: 31.22 KG/M2

## 2019-08-16 DIAGNOSIS — Z11.3: Primary | ICD-10-CM

## 2019-08-16 LAB — URINE PH (DIP) POC: 7 (ref 5–8.5)

## 2019-08-16 PROCEDURE — 81025 URINE PREGNANCY TEST: CPT

## 2019-08-16 PROCEDURE — 99283 EMERGENCY DEPT VISIT LOW MDM: CPT

## 2019-08-16 PROCEDURE — 81003 URINALYSIS AUTO W/O SCOPE: CPT

## 2019-08-16 PROCEDURE — 87591 N.GONORRHOEAE DNA AMP PROB: CPT

## 2019-08-16 NOTE — ERD
ER Documentation


Chief Complaint


Chief Complaint





ABD PAIN X1 WEEK, NO N/V/D, NO DYSURIA





HPI


Patient is a 29-year-old female who presents the ER for concerns of requesting 


STD testing.  Patient states she had unprotected sex recently.  Patient states 


she occasionally has abdominal cramps however she has no symptoms at this time. 


Patient denies any fevers, chills, nausea, vomiting, diarrhea, dysuria, 


frequency, urgency, vaginal bleeding or vaginal discharge.





ROS


All systems reviewed and are negative except as per history of present illness.





Medications


Home Meds


Active Scripts


Famotidine* (Pepcid*) 20 Mg Tablet, 20 MG PO BID for 14 Days, TAB


   Prov:MINDY MCKINNON MD         4/29/19


Betamethasone Dipropionate* (Betamethasone Dipropionate*) 0.05% - 15 Gm Oint, 1 


APPLIC TOP BID for 7 Days, #15 GM


   APPLY TO:


   Prov:EDDIEENOCH OWUSU         6/16/18


Fluconazole* (Diflucan*) 150 Mg Tablet, 150 MG PO ONCE, #1 TAB


   Prov:DELMIS JC PA-C         9/23/17


Clotrimazole* (Clotrimazole* AF) 1% - 30 Gm Cream.gm., 1 APPLIC TOP BID for 7 


Days, #1 TUB


   Prov:DELMIS JC PA-C         9/23/17


Naproxen* (Naprosyn*) 500 Mg Tablet, 500 MG PO BID PRN for PAIN AND/OR 


INFLAMMATION, #30 TAB


   Prov:DELMIS JC PA-C         9/23/17


Hydrocortisone Acetate* (Anusol-HC*) 30 Gm Cream.gm., 1 APPLIC PA BID, #1 TUB


   Prov:WILLOW MCCAULEY PA-C         2/19/17


Famotidine* (Pepcid*) 20 Mg Tablet, 20 MG PO BID for 30 Days, #60 TAB


   Prov:WILLOW MCCAULEYC         2/18/17


Ibuprofen* (Motrin*) 600 Mg Tab, 600 MG PO Q6, #20 TAB


   Prov:GAMEZNICK ROYAL I. NP         5/9/16


Diclofenac Sodium* (Diclofenac Sodium*) 50 Mg Tablet.dr, 50 MG PO BID, #14 TAB


   Prov:GAMEZNICK ROYAL NP         5/9/16


Acetaminophen* (Tylophen*) 500 Mg Capsule, 1 CAP PO Q6H PRN for PAIN AND OR 


ELEVATED TEMP, #20 CAP


   Prov:EULA LIMA MD         3/31/16


Famotidine* (Famotidine*) 40 Mg Tablet, 40 MG PO HS, #30 TAB


   Prov:EULA LIMA MD         3/31/16


Ibuprofen* (Motrin*) 600 Mg Tab, 600 MG PO Q6, #30 TAB


   Prov:TAYO HOPE         1/15/16


Pramoxine Hcl* (Anusol*) 28.3 Gm Oint...g., 1 APPLIC PA BID for 7 Days, TUB


   Prov:HERMINIA,TAYO C         1/15/16





Allergies


Allergies:  


Coded Allergies:  


     No Known Allergy (Unverified , 1/15/16)





PMhx/Soc


History of Surgery:  No


Anesthesia Reaction:  No


Hx Neurological Disorder:  No


Hx Respiratory Disorders:  No


Hx Cardiac Disorders:  No


Hx Psychiatric Problems:  Yes (anxiety)


Hx Miscellaneous Medical Probl:  No


Hx Alcohol Use:  Yes (SOCIALLY)


Hx Substance Use:  No


Hx Tobacco Use:  No


Smoking Status:  Never smoker





FmHx


Family History:  No diabetes





Physical Exam


Vitals





Vital Signs


  Date      Temp  Pulse  Resp  B/P (MAP)   Pulse Ox  O2          O2 Flow    FiO2


Time                                                 Delivery    Rate


   8/16/19  97.6     70    17      119/65        99


     18:43                           (83)


   8/16/19  97.6     60    17      119/65        99


     17:40                           (83)





Physical Exam


GENERAL: Well-developed, well-nourished female. Appears in no acute distress. 


HEAD: Normocephalic, atraumatic. 


EYES: Pupils are equally reactive bilaterally. EOMs grossly intact. No 


conjunctival erythema.


NECK: Supple. No meningismus. Normal range of motion of the neck.


LUNG: No respiratory distress


ABDOMEN: Soft, nontender, and nondistended. Positive bowel sounds in all four q


uadrants. No rebound tenderness, no guarding. (-) McBurney's point tenderness. 


No CVA tenderness.


EXTREMITIES: Equal pulses bilaterally. No peripheral clubbing, cyanosis or 


edema. No unilateral leg swelling.


NEUROLOGIC: Alert and oriented. Moving all four extremities without any 


difficulty. Normal speech. Steady gait. 


SKIN: Normal color. Warm and dry. No rashes or lesions.


Results 24 hrs





Laboratory Tests


       Test
                                8/16/19
18:28  8/16/19
18:30


       POC Beta HCG, Qualitative            NEGATIVE


       Bedside Urine pH (LAB)                                       7.0


       Bedside Urine Protein (LAB)                         Negative


       Bedside Urine Glucose (UA)                          Negative


       Bedside Urine Ketones (LAB)                         Negative


       Bedside Urine Blood                                 Trace-intact


       Bedside Urine Nitrite (LAB)                         Negative


       Bedside Urine Leukocyte
Esterase (L  
              Negative 









Procedures/MDM


MEDICAL DECISION MAKING: 


Patient is a 29-year-old female presents the ER for concerns of STD testing.  


Vital signs were reviewed. Patient is afebrile. 


Abdominal exam was benign.  Patient had no peritoneal signs.


UA showed no evidence of acute infection or hematuria. Urine pregnancy test was 


negative.  Urine gonorrhea and Chlamydia testing obtained, results pending.  


Patient advised that results will be available in 3 to 5 days as this is a send 


out test.


This time, patient's presentation is most consistent with STD screening.


Differential diagnoses include was not limited to acute coronary syndrome, AAA, 


mesenteric ischemia, lower lobe pneumonia, DKA, bowel perforation, cholecy


stitis, choledocholithiasis, ascending cholangitis, hepatic abscess, 


pancreatitis, PUD, gastritis, GERD, splenic rupture, diverticulitis, UTI, 


pyelonephritis, nephrolithiasis, appendicitis, constipation, pregnancy, ectopic 


pregnancy, PID, ovarian torsion or tubo-ovarian abscess.  Patient was nontoxic, 


non-ill-appearing prior to discharge.





DISCHARGE:


At this time, patient is stable for discharge and outpatient management. I have 


instructed the patient to follow-up with his/her primary care physician in 1-2 


days. I have instructed the patient to promptly return to the ER at any time for


any new or worsening symptoms including increased pain, nausea, vomiting, 


diarrhea, fever, weakness or LOC. The patient and/or family expressed 


understanding of and agreement with this plan. All questions were answered. Home


care instructions were provided. 








Disclaimer: Inadvertent spelling and grammatical errors are likely due to 


EHR/dictation software use and do not reflect on the overall quality of patient 


care. Also, please note that the electronic time recorded on this note does not 


necessarily reflect the actual time of the patient encounter.





Departure


Diagnosis:  


   Primary Impression:  


   Screening for STD (sexually transmitted disease)


Condition:  Fair


Patient Instructions:  Teens: STD Symptoms in Women


Referrals:  


COMMUNITY CLINICS


YOU HAVE RECEIVED A MEDICAL SCREENING EXAM AND THE RESULTS INDICATE THAT YOU DO 


NOT HAVE A CONDITION THAT REQUIRES URGENT TREATMENT IN THE EMERGENCY DEPARTMENT.





FURTHER EVALUATION AND TREATMENT OF YOUR CONDITION CAN WAIT UNTIL YOU ARE SEEN 


IN YOUR DOCTORS OFFICE WITHIN THE NEXT 1-2 DAYS. IT IS YOUR RESPONSIBILITY TO 


MAKE AN APPOINTMENT FOR FOLOW-UP CARE.





IF YOU HAVE A PRIMARY DOCTOR


--you should call your primary doctor and schedule an appointment





IF YOU DO NOT HAVE A PRIMARY DOCTOR YOU CAN CALL OUR PHYSICIAN REFERRAL HOTLINE 


AT


 [phone] 





IF YOU CAN NOT AFFORD TO SEE A PHYSICIAN YOU CAN CHOSE FROM THE FOLLOWING 


[facility] CLINICS





[facility] [phone] [address]. [facility] [phone] [address] [facility]. [facility] [phone] [address] [facility] [phone] [address] [facility]. [facility] [phone] [address] [facility]. [facility]. [phone]


[address]. [facility]


YOU HAVE RECEIVED A MEDICAL SCREENING EXAM AND THE RESULTS INDICATE THAT YOU DO 


NOT HAVE A CONDITION THAT REQUIRES URGENT TREATMENT IN THE EMERGENCY DEPARTMENT.





FURTHER EVALUATION AND TREATMENT OF YOUR CONDITION CAN WAIT UNTIL YOU ARE SEEN 


IN YOUR DOCTORS OFFICE WITHIN THE NEXT 1-2 DAYS. IT IS YOUR RESPONSIBILITY TO 


MAKE AN APPOINTMENT FOR FOLOW-UP CARE.





IF YOU HAVE A PRIMARY DOCTOR


--you should call your primary doctor and schedule and appointment





IF YOU DO NOT HAVE A PRIMARY DOCTOR YOU CAN CALL OUR PHYSICIAN REFERRAL HOTLINE 


AT [phone].





IF YOU CAN NOT AFFORD TO SEE A PHYSICIAN YOU CAN CHOSE FROM THE FOLLOWING [facility]


INSTITUTIONS:





[facility]


[address] [place] [address]





[facility]


[address] [place] [address]





[facility]


[address] [place] [address]





OB/GYN REFERRAL LIST


ROBBIE PEREIRA MD


[address] 


[address] [place] [address] [phone] OFFICE FAX [phone]





DR.ITZEL BANSAL


[address] [place] [address]


[phone]





DR. BAÑUELOS RICHARD


[address] [place] [address]


[phone]





DR PANG, [facility]


[address], [address], [place] [address]


[phone]





[facility]


[address] [place] [address]


[phone]





[facility]


[address] [place] [address]


[phone] [address] [facility] [address]


[phone]





DR MOSER, THELMA


[address], [place] [facility] [address]


[phone]





DR BUSTOS, REJI


[address] [facility] [address], [place] [address]


[phone]





DR SIMMONS, [facility]


[address] [place], [place] [id number] [phone]





Additional Instructions:  


Follow up with your primary care doctor or [facility] for additional STD


screening test.





Call your primary care doctor TOMORROW for an appointment during the next 1-2 


days.See the doctor sooner or return here if your condition worsens before your 


appointment time.











WILLOW MCCAULEY PA-C             Aug 16, 2019 18:21


ELIAZAR LOO MD      Aug 17, 2019 12:31

## 2019-08-17 LAB — LABORATORY COMMENT REPORT: (no result)
